# Patient Record
Sex: MALE | Race: BLACK OR AFRICAN AMERICAN | NOT HISPANIC OR LATINO | Employment: OTHER | URBAN - NONMETROPOLITAN AREA
[De-identification: names, ages, dates, MRNs, and addresses within clinical notes are randomized per-mention and may not be internally consistent; named-entity substitution may affect disease eponyms.]

---

## 2017-01-23 ENCOUNTER — OFFICE VISIT (OUTPATIENT)
Dept: NEUROSURGERY | Facility: CLINIC | Age: 42
End: 2017-01-23

## 2017-01-23 VITALS
BODY MASS INDEX: 25.61 KG/M2 | HEIGHT: 64 IN | WEIGHT: 150 LBS | DIASTOLIC BLOOD PRESSURE: 90 MMHG | SYSTOLIC BLOOD PRESSURE: 150 MMHG

## 2017-01-23 DIAGNOSIS — M51.36 DEGENERATION OF LUMBAR INTERVERTEBRAL DISC: Primary | ICD-10-CM

## 2017-01-23 DIAGNOSIS — Z78.9 NONSMOKER: ICD-10-CM

## 2017-01-23 DIAGNOSIS — M54.16 LUMBAR RADICULOPATHY: ICD-10-CM

## 2017-01-23 PROCEDURE — 99214 OFFICE O/P EST MOD 30 MIN: CPT | Performed by: NEUROLOGICAL SURGERY

## 2017-01-23 RX ORDER — CYCLOBENZAPRINE HCL 10 MG
TABLET ORAL 3 TIMES DAILY
COMMUNITY

## 2017-01-23 RX ORDER — TEMAZEPAM 30 MG/1
CAPSULE ORAL
COMMUNITY
End: 2017-10-27 | Stop reason: SDUPTHER

## 2017-01-23 RX ORDER — PRAZOSIN HYDROCHLORIDE 1 MG/1
CAPSULE ORAL
COMMUNITY
End: 2017-10-27 | Stop reason: DRUGHIGH

## 2017-01-23 RX ORDER — BUSPIRONE HYDROCHLORIDE 15 MG/1
TABLET ORAL
COMMUNITY
End: 2017-10-27 | Stop reason: SDUPTHER

## 2017-01-23 RX ORDER — MIRTAZAPINE 15 MG/1
TABLET, FILM COATED ORAL
COMMUNITY
End: 2017-10-27 | Stop reason: DRUGHIGH

## 2017-01-23 RX ORDER — NAPROXEN 25 MG/ML
SUSPENSION ORAL
COMMUNITY

## 2017-01-23 RX ORDER — SAW/VIT E/SOD SEL/LYC/BETA/PYG 160-100
TABLET ORAL
COMMUNITY

## 2017-01-23 NOTE — MR AVS SNAPSHOT
Tomasa Flores   1/23/2017 2:45 PM   Office Visit    Dept Phone:  [phone]   Encounter #:  [id number]    Provider:  Pepe Hernandez MD   Department:  [facility] NEUROSURGERY                Your Full Care Plan              Your Updated Medication List          This list is accurate as of: 1/23/17  3:38 PM.  Always use your most recent med list.                ACETAMINOPHEN-CODEINE PO       BEE POLLEN PO       * busPIRone 15 MG tablet   Commonly known as:  BUSPAR       * busPIRone 15 MG tablet   Commonly known as:  BUSPAR       * cyclobenzaprine 10 MG tablet   Commonly known as:  FLEXERIL       * cyclobenzaprine 10 MG tablet   Commonly known as:  FLEXERIL       esomeprazole 40 MG capsule   Commonly known as:  nexIUM       FISH OIL PO       gabapentin 800 MG tablet   Commonly known as:  NEURONTIN       latanoprost 0.005 % ophthalmic solution   Commonly known as:  XALATAN       lisinopril 20 MG tablet   Commonly known as:  PRINIVIL,ZESTRIL       LUTEIN-BILBERRY PO       * mirtazapine 15 MG tablet   Commonly known as:  REMERON       * mirtazapine 45 MG tablet   Commonly known as:  REMERON       naproxen 125 MG/5ML suspension   Commonly known as:  NAPROSYN       OCUVITE PO       * prazosin 1 MG capsule   Commonly known as:  MINIPRESS       * prazosin 2 MG capsule   Commonly known as:  MINIPRESS       PROBIOTIC & ACIDOPHILUS EX ST capsule       * temazepam 30 MG capsule   Commonly known as:  RESTORIL       * temazepam 30 MG capsule   Commonly known as:  RESTORIL       vitamin E 100 UNIT capsule       * Notice:  This list has 10 medication(s) that are the same as other medications prescribed for you. Read the directions carefully, and ask your doctor or other care provider to review them with you.            You Were Diagnosed With        Codes Comments    Degeneration of lumbar intervertebral disc    -  Primary ICD-10-CM: M51.36  ICD-9-CM: 722.52     Lumbar  "radiculopathy     ICD-10-CM: M54.16  ICD-9-CM: 724.4     Nonsmoker     ICD-10-CM: Z78.9  ICD-9-CM: V49.89       Instructions     None    Patient Instructions History      Upcoming Appointments     Visit Type Date Time Department    FOLLOW UP 1/23/2017  2:45 PM [facility]UROS[facility]    FOLLOW UP 2/15/2017 10:30 AM [facility]      MyChart Signup     Our records indicate that you have declined [facility] sign[facility]. If you would like to sign up for [employer], please email [email or web address] or call [phone] to obtain an activation code.             Other Info from Your Visit           Your Appointments     Feb 15, 2017 10:30 AM CST   Follow Up with JANET Vargas   [facility] (--)    [address] [place] [id number] [phone]           Arrive 15 minutes prior to appointment.              Allergies     Aspirin      Caffeine      Aloe  Rash      Reason for Visit     Back Pain     Leg Pain Right      Vital Signs     Blood Pressure Height Weight Body Mass Index Smoking Status       150/90 64\" (162.6 cm) 150 lb (68 kg) 25.75 kg/m2 Former Smoker       Problems and Diagnoses Noted     Degeneration of lumbar or lumbosacral intervertebral disc    Lumbar nerve root disorder    Nonsmoker            "

## 2017-01-23 NOTE — LETTER
January 23, 2017     Kamila Gibson MD  [address] [facility] [address]    Patient: Tomasa Flores   YOB: 1975   Date of Visit: 1/23/2017       Dear Dr. Arthur MD:    Tomasa Flores was in my office today. Below is a copy of my note.    If you have questions, please do not hesitate to call me. I look forward to following Tomasa along with you.         Sincerely,        Pepe Hernandez MD        CC: No Recipients    Patient ID: Tomaas Flores is a 41 y.o. male here today for []  low back and right leg pain    HPI:  [] Mr. Tolentino work related injury 2005.  He was lifting on a barge when he felt something snap in his back.  This was during her cane Janay for working on a barge.  He has had MRI.  I reviewed this.  He has the lower levels with bulging disc narrowed foramen towards her right.  An EMG nerve conduction was ordered to help determine the for specific nerve root may be involved.  We do not have that report as yet.  He has had pain management with Dr. Sean Damon.  He would like to have surgery if indicated and get off pain medication.    The following portions of the patient's history were reviewed and updated as appropriate: allergies, current medications, past family history, past medical history, past social history, past surgical history and problem list.    Review of Systems:  unchanged    Past Medical History   Diagnosis Date   • Hypertension        No family history on file.    Social History   Substance Use Topics   • Smoking status: Former Smoker   • Smokeless tobacco: None      Comment: Quit 2011   • Alcohol use No       Past Surgical History   Procedure Laterality Date   • Shoulder surgery Right    • Gallbladder surgery         Aspirin; Caffeine; and Aloe      Physical Exam:  [] Gait abnormal very antalgic limps on the right lower extremity.  Straightening of lower back.  Limited forward flexion.  Straight leg raising painful on right.  He has weakness quadriceps on the  right.  Diminished knee jerk on the right.  S clear heart regular no murmur no bruit.    Review of Radiographic Studies:  [] MRI lumbar spine reviewed again.      Medical Decision Making:  [] EMG nerve conduction as not reported as yet and not available to me today.  He probably will need some form of decompressive surgery.  It is a question of which or how many levels.  I would like to see the EMG before making that decision.    1. Degeneration of lumbar intervertebral disc    2. Lumbar radiculopathy    3. Nonsmoker         Return in about 3 weeks (around 2/13/2017).

## 2017-01-23 NOTE — PROGRESS NOTES
Patient ID: Tomasa Flores is a 41 y.o. male here today for []  low back and right leg pain    HPI:  [] Mr. Tolentino work related injury 2005.  He was lifting on a barge when he felt something snap in his back.  This was during her cane Janay for working on a barge.  He has had MRI.  I reviewed this.  He has the lower levels with bulging disc narrowed foramen towards her right.  An EMG nerve conduction was ordered to help determine the for specific nerve root may be involved.  We do not have that report as yet.  He has had pain management with Dr. Sean Damon.  He would like to have surgery if indicated and get off pain medication.    The following portions of the patient's history were reviewed and updated as appropriate: allergies, current medications, past family history, past medical history, past social history, past surgical history and problem list.    Review of Systems:  unchanged    Past Medical History   Diagnosis Date   • Hypertension        No family history on file.    Social History   Substance Use Topics   • Smoking status: Former Smoker   • Smokeless tobacco: None      Comment: Quit 2011   • Alcohol use No       Past Surgical History   Procedure Laterality Date   • Shoulder surgery Right    • Gallbladder surgery         Aspirin; Caffeine; and Aloe      Physical Exam:  [] Gait abnormal very antalgic limps on the right lower extremity.  Straightening of lower back.  Limited forward flexion.  Straight leg raising painful on right.  He has weakness quadriceps on the right.  Diminished knee jerk on the right.  S clear heart regular no murmur no bruit.    Review of Radiographic Studies:  [] MRI lumbar spine reviewed again.      Medical Decision Making:  [] EMG nerve conduction as not reported as yet and not available to me today.  He probably will need some form of decompressive surgery.  It is a question of which or how many levels.  I would like to see the EMG before making that decision.    1.  Degeneration of lumbar intervertebral disc    2. Lumbar radiculopathy    3. Nonsmoker         Return in about 3 weeks (around 2/13/2017).

## 2017-01-24 ENCOUNTER — DOCUMENTATION (OUTPATIENT)
Dept: NEUROSURGERY | Facility: CLINIC | Age: 42
End: 2017-01-24

## 2017-02-15 ENCOUNTER — OFFICE VISIT (OUTPATIENT)
Dept: NEUROSURGERY | Facility: CLINIC | Age: 42
End: 2017-02-15

## 2017-02-15 VITALS
WEIGHT: 160 LBS | DIASTOLIC BLOOD PRESSURE: 76 MMHG | BODY MASS INDEX: 27.31 KG/M2 | SYSTOLIC BLOOD PRESSURE: 120 MMHG | HEIGHT: 64 IN

## 2017-02-15 DIAGNOSIS — M51.36 DEGENERATION OF LUMBAR INTERVERTEBRAL DISC: Primary | ICD-10-CM

## 2017-02-15 PROCEDURE — 99213 OFFICE O/P EST LOW 20 MIN: CPT | Performed by: NURSE PRACTITIONER

## 2017-02-15 NOTE — PROGRESS NOTES
"Neurosurgery Follow Up Office Visit      Patient Name:  Tomasa Flores  Age:  41 y.o.  YOB: 1975  MR#:  [id number]    Visit Vitals   • /76   • Ht 64\" (162.6 cm)   • Wt 160 lb (72.6 kg)   • BMI 27.46 kg/m2       Social History   Substance Use Topics   • Smoking status: Former Smoker   • Smokeless tobacco: Never Used      Comment: Quit 2011   • Alcohol use No       Chief Complaint   Patient presents with   • Results     Patient here for review of EMG/NCV results. He reports no changes in symptoms.          History  Chief Complaint:  He and his wife return to the office for follow-up and additional test results.  He continues having low back and right leg pain only.  He has never had any symptoms on the left.  He continues to describe pain in his right groin and actually more mesially down the right leg.  He states that he does get numbness and tingling in the leg, but more in his right foot and toes.  He feels he is very weak and that his weakness continues to progress.  He has tried all conservative measures of treatment.  They explained that physical therapy was put on hold as it actually seemed to worsen his pain and symptoms rather than provide any help.  Dr. Denise in discontinued epidural injections as they had become ineffective.  The patient continues on a combination of chronic medications for treatment of his continued pain and symptoms.  Previous MRI review showed disc degeneration most noted at L5-S1 more rightward and some foraminal narrowing bilaterally at L4 5.  We needed EMG nerve conduction testing to help make a more definitive decision and the results of that were normal.  He has returned today to make a definite decision on surgery.      Physical Examination:  On exam, he does seem uncomfortable.  He is awake and alert, pleasant and cooperative, speech clear and appropriate.  Skin is warm and dry.  Straight leg raising positive on the right at approximately 30°.  He is " areflexic at the right patellar, even with reinforcement.  He is a good 1-2+ at both ankles.  He has significant weakness on the right and cannot even attempt some of the muscle group testing.  He is weak with dorsiflexion.  He ambulates with very antalgic gait favoring the right and needs the assistance of a cane.  Lumbar mobility is very limited.      Medical Decision Making  Treatment Options:   In the office we have discussed his care.  I have asked for Dr. Hernandez to again reviewed the lumbar MRI with the addition of the normal nerve conduction results.  After reviewing all of this in total, it is not felt that surgery would be beneficial for the patient.  Dr. Hernandez has also examined the patient and personally reviewed with him his review of the testing.  The patient's presentation is more of a probable stretching injury to the nerves on the right with resulting chronic issues.  It is not felt that any attempts at lumbar surgery would alleviate any of the symptoms that he has or help to improve his overall situation.  The patient and his family are understanding of that.  It is recommended that he continue to follow with Dr. Denise.  We would see the patient back at any time in the future if he developed any new or significantly worsening problems and if repeat imaging were indicated.  This is all agreeable.      Assessment and Plan  Tomasa was seen today for results.    Diagnoses and all orders for this visit:    Degeneration of lumbar intervertebral disc    Other orders  -     SCANNED EMG        Return if symptoms worsen or fail to improve.      JANET Kang

## 2017-03-21 ENCOUNTER — HOSPITAL ENCOUNTER (OUTPATIENT)
Age: 42
Setting detail: OUTPATIENT SURGERY
Discharge: HOME | End: 2017-03-21
Attending: PHYSICAL MEDICINE & REHABILITATION | Admitting: PHYSICAL MEDICINE & REHABILITATION

## 2017-03-21 ENCOUNTER — HOSPITAL ENCOUNTER (OUTPATIENT)
Dept: GENERAL RADIOLOGY | Age: 42
Discharge: HOME | End: 2017-03-21
Payer: MEDICAID

## 2017-03-21 VITALS
SYSTOLIC BLOOD PRESSURE: 117 MMHG | RESPIRATION RATE: 20 BRPM | WEIGHT: 160 LBS | DIASTOLIC BLOOD PRESSURE: 77 MMHG | BODY MASS INDEX: 27.31 KG/M2 | HEIGHT: 64 IN | HEART RATE: 80 BPM | OXYGEN SATURATION: 98 %

## 2017-03-21 DIAGNOSIS — R52 PAIN: ICD-10-CM

## 2017-03-21 PROCEDURE — 3209999900 FLUORO FOR SURGICAL PROCEDURES

## 2017-03-21 PROCEDURE — G8907 PT DOC NO EVENTS ON DISCHARG: HCPCS

## 2017-03-21 PROCEDURE — G8918 PT W/O PREOP ORDER IV AB PRO: HCPCS

## 2017-03-21 PROCEDURE — 62322 NJX INTERLAMINAR LMBR/SAC: CPT

## 2017-03-21 RX ORDER — LIDOCAINE HYDROCHLORIDE 10 MG/ML
INJECTION, SOLUTION INFILTRATION; PERINEURAL PRN
Status: DISCONTINUED | OUTPATIENT
Start: 2017-03-21 | End: 2017-03-21 | Stop reason: HOSPADM

## 2017-03-21 RX ORDER — 0.9 % SODIUM CHLORIDE 0.9 %
VIAL (ML) INJECTION PRN
Status: DISCONTINUED | OUTPATIENT
Start: 2017-03-21 | End: 2017-03-21 | Stop reason: HOSPADM

## 2017-03-21 RX ORDER — METHYLPREDNISOLONE ACETATE 80 MG/ML
INJECTION, SUSPENSION INTRA-ARTICULAR; INTRALESIONAL; INTRAMUSCULAR; SOFT TISSUE PRN
Status: DISCONTINUED | OUTPATIENT
Start: 2017-03-21 | End: 2017-03-21 | Stop reason: HOSPADM

## 2017-05-01 ENCOUNTER — HOSPITAL ENCOUNTER (OUTPATIENT)
Dept: GENERAL RADIOLOGY | Facility: HOSPITAL | Age: 42
Discharge: HOME | End: 2017-05-01
Admitting: NURSE PRACTITIONER

## 2017-05-01 ENCOUNTER — TRANSCRIBE ORDERS (OUTPATIENT)
Dept: ADMINISTRATIVE | Facility: HOSPITAL | Age: 42
End: 2017-05-01

## 2017-05-01 DIAGNOSIS — M25.551 TENDERNESS OF RIGHT HIP: ICD-10-CM

## 2017-05-01 DIAGNOSIS — M25.551 TENDERNESS OF RIGHT HIP: Primary | ICD-10-CM

## 2017-05-01 PROCEDURE — 73502 X-RAY EXAM HIP UNI 2-3 VIEWS: CPT

## 2017-05-30 ENCOUNTER — HOSPITAL ENCOUNTER (OUTPATIENT)
Age: 42
Setting detail: OUTPATIENT SURGERY
Discharge: HOME | End: 2017-05-30
Attending: PHYSICAL MEDICINE & REHABILITATION | Admitting: PHYSICAL MEDICINE & REHABILITATION

## 2017-05-30 ENCOUNTER — HOSPITAL ENCOUNTER (OUTPATIENT)
Dept: GENERAL RADIOLOGY | Age: 42
Discharge: HOME | End: 2017-05-30
Payer: MEDICAID

## 2017-05-30 VITALS
SYSTOLIC BLOOD PRESSURE: 138 MMHG | RESPIRATION RATE: 18 BRPM | DIASTOLIC BLOOD PRESSURE: 88 MMHG | WEIGHT: 160 LBS | OXYGEN SATURATION: 97 % | HEIGHT: 64 IN | BODY MASS INDEX: 27.31 KG/M2 | HEART RATE: 84 BPM

## 2017-05-30 DIAGNOSIS — R52 PAIN: ICD-10-CM

## 2017-05-30 PROCEDURE — G8907 PT DOC NO EVENTS ON DISCHARG: HCPCS

## 2017-05-30 PROCEDURE — 62322 NJX INTERLAMINAR LMBR/SAC: CPT

## 2017-05-30 PROCEDURE — 3209999900 FLUORO FOR SURGICAL PROCEDURES

## 2017-05-30 PROCEDURE — G8918 PT W/O PREOP ORDER IV AB PRO: HCPCS

## 2017-05-30 PROCEDURE — 62323 NJX INTERLAMINAR LMBR/SAC: CPT

## 2017-05-30 RX ORDER — METHYLPREDNISOLONE ACETATE 80 MG/ML
INJECTION, SUSPENSION INTRA-ARTICULAR; INTRALESIONAL; INTRAMUSCULAR; SOFT TISSUE PRN
Status: DISCONTINUED | OUTPATIENT
Start: 2017-05-30 | End: 2017-05-30 | Stop reason: HOSPADM

## 2017-05-30 RX ORDER — LIDOCAINE HYDROCHLORIDE 10 MG/ML
INJECTION, SOLUTION INFILTRATION; PERINEURAL PRN
Status: DISCONTINUED | OUTPATIENT
Start: 2017-05-30 | End: 2017-05-30 | Stop reason: HOSPADM

## 2017-05-30 RX ORDER — 0.9 % SODIUM CHLORIDE 0.9 %
VIAL (ML) INJECTION PRN
Status: DISCONTINUED | OUTPATIENT
Start: 2017-05-30 | End: 2017-05-30 | Stop reason: HOSPADM

## 2017-05-30 RX ORDER — LISINOPRIL 10 MG/1
10 TABLET ORAL DAILY
COMMUNITY

## 2017-10-27 ENCOUNTER — APPOINTMENT (OUTPATIENT)
Dept: LAB | Facility: HOSPITAL | Age: 42
End: 2017-10-27

## 2017-10-27 ENCOUNTER — OFFICE VISIT (OUTPATIENT)
Dept: PAIN MEDICINE | Facility: CLINIC | Age: 42
End: 2017-10-27

## 2017-10-27 VITALS
BODY MASS INDEX: 26.24 KG/M2 | WEIGHT: 153.7 LBS | SYSTOLIC BLOOD PRESSURE: 138 MMHG | HEIGHT: 64 IN | DIASTOLIC BLOOD PRESSURE: 88 MMHG

## 2017-10-27 DIAGNOSIS — Z79.899 HIGH RISK MEDICATIONS (NOT ANTICOAGULANTS) LONG-TERM USE: ICD-10-CM

## 2017-10-27 DIAGNOSIS — M79.18 MYOFACIAL MUSCLE PAIN: ICD-10-CM

## 2017-10-27 DIAGNOSIS — M54.5 CHRONIC LOW BACK PAIN, UNSPECIFIED BACK PAIN LATERALITY, WITH SCIATICA PRESENCE UNSPECIFIED: Primary | ICD-10-CM

## 2017-10-27 DIAGNOSIS — M51.36 DDD (DEGENERATIVE DISC DISEASE), LUMBAR: ICD-10-CM

## 2017-10-27 DIAGNOSIS — M25.551 CHRONIC RIGHT HIP PAIN: ICD-10-CM

## 2017-10-27 DIAGNOSIS — G89.29 CHRONIC LOW BACK PAIN, UNSPECIFIED BACK PAIN LATERALITY, WITH SCIATICA PRESENCE UNSPECIFIED: Primary | ICD-10-CM

## 2017-10-27 DIAGNOSIS — G89.29 CHRONIC RIGHT HIP PAIN: ICD-10-CM

## 2017-10-27 DIAGNOSIS — M47.817 LUMBOSACRAL SPONDYLOSIS WITHOUT MYELOPATHY: ICD-10-CM

## 2017-10-27 PROCEDURE — 99214 OFFICE O/P EST MOD 30 MIN: CPT | Performed by: NURSE PRACTITIONER

## 2017-10-27 PROCEDURE — 80307 DRUG TEST PRSMV CHEM ANLYZR: CPT | Performed by: NURSE PRACTITIONER

## 2017-10-27 PROCEDURE — G0481 DRUG TEST DEF 8-14 CLASSES: HCPCS | Performed by: NURSE PRACTITIONER

## 2017-10-27 RX ORDER — ERGOCALCIFEROL (VITAMIN D2) 10 MCG
800 TABLET ORAL DAILY
COMMUNITY

## 2017-10-27 NOTE — PROGRESS NOTES
"Tomasa Flores is a 42 y.o. male.   1975    HPI:   Location: lower back, right hip and right leg  Quality: aching, sharp, dull, numbness and tingling  Severity: 8/10  Timing: constant  Alleviating: relaxation, pain medication and heating pad  Aggravating: sitting , standing , ambulating , increased activity and weather      Patient referred to pain management via Dr. Gibson related to chronic lower back pain with reported right leg involvement. Pt with chronic lower back pain and right leg pain for 12 years since work related injury. No bowel or bladder.  Pt was seen by Camelia, pt had MRI done. Pt was seen by Dr. Woody Neuro Surgeon- possible surgery at some point. Pt states \"he acted all interested then all of a sudden stopped\". Pt wanted 2nd opinion- neuro surgeon in [facility] [place] ( Dr. Shankar) . Myelogram done and \"tore ligaments in right hip\". Pt had right hip injection (Dr. Lara) with some effectiveness. I have read EMG from Dr. Goins- \"Neg EMG\".  Pt will follow up with Dr. Lara. Pt currently sees Dr. Alexander [facility] for PTSD, Depression, and Anxiety- I have encouraged patient to continue follow ups related to PHQ-9 27. Explained in great detail history and physical, examination, ancillary physician notes and images reviewed, and pain management options.          The following portions of the patient's history were reviewed by me and updated as appropriate: allergies, current medications, past family history, past medical history, past social history, past surgical history and problem list.    Past Medical History:   Diagnosis Date   • Anxiety    • Arthritis    • Depression    • Glaucoma    • Headache    • Hypertension    • Insomnia        Social History     Social History   • Marital status: Single     Spouse name: N/A   • Number of children: N/A   • Years of education: N/A     Occupational History   • Disabled      Social History Main Topics   • Smoking status: Former Smoker   • " Smokeless tobacco: Never Used      Comment: Quit 2011 still smokes occassionally   • Alcohol use No   • Drug use: No   • Sexual activity: Defer     Other Topics Concern   • Not on file     Social History Narrative       Family History   Problem Relation Age of Onset   • Alcohol abuse Mother    • Heart disease Mother    • Hypertension Mother    • Mental illness Mother    • Depression Mother    • Diabetes Father    • Heart disease Father    • Hypertension Father    • Depression Sister    • Mental illness Sister    • Depression Sister    • Mental illness Sister    • Depression Sister    • Mental illness Sister    • Mental illness Brother    • Depression Brother    • Glaucoma Brother    • Crohn's disease Brother    • Hypertension Brother    • Alcohol abuse Brother    • Early death Brother      murder   • Cancer Maternal Aunt      lung         Current Outpatient Prescriptions:   •  ACETAMINOPHEN-CODEINE PO, Take 75 mg by mouth., Disp: , Rfl:   •  BEE POLLEN PO, Take by mouth, Disp: , Rfl:   •  busPIRone (BUSPAR) 15 MG tablet, , Disp: , Rfl:   •  cyclobenzaprine (FLEXERIL) 10 MG tablet, 3 (Three) Times a Day., Disp: , Rfl:   •  gabapentin (NEURONTIN) 800 MG tablet, , Disp: , Rfl:   •  latanoprost (XALATAN) 0.005 % ophthalmic solution, , Disp: , Rfl:   •  lisinopril (PRINIVIL,ZESTRIL) 20 MG tablet, , Disp: , Rfl:   •  LUTEIN-BILBERRY PO, Take by mouth, Disp: , Rfl:   •  mirtazapine (REMERON) 45 MG tablet, , Disp: , Rfl:   •  naproxen (NAPROSYN) 125 MG/5ML suspension, Take by mouth 2 times daily, Disp: , Rfl:   •  prazosin (MINIPRESS) 2 MG capsule, , Disp: , Rfl:   •  Probiotic Product (PROBIOTIC & ACIDOPHILUS EX ST) capsule, Take by mouth, Disp: , Rfl:   •  temazepam (RESTORIL) 30 MG capsule, , Disp: , Rfl:   •  Vitamin D, Cholecalciferol, (CHOLECALCIFEROL) 400 units tablet, Take 800 Units by mouth Daily., Disp: , Rfl:   •  vitamin E 100 UNIT capsule, Take 100 Units by mouth daily, Disp: , Rfl:   •  esomeprazole (nexIUM)  40 MG capsule, , Disp: , Rfl:   •  Multiple Vitamins-Minerals (OCUVITE PO), Take by mouth, Disp: , Rfl:   •  Omega-3 Fatty Acids (FISH OIL PO), Take by mouth, Disp: , Rfl:     Allergies   Allergen Reactions   • Aspirin Hives     High dose   • Caffeine    • Aloe Rash       Review of Systems   Musculoskeletal: Positive for back pain (lower).        Right hip pain  Right leg pain   All other systems reviewed and are negative.    All review of systems reviewed and negative except for above.    Physical Exam   Constitutional: He is oriented to person, place, and time. He appears well-developed and well-nourished. No distress.   HENT:   Head: Normocephalic and atraumatic.   Eyes: Conjunctivae and EOM are normal. Pupils are equal, round, and reactive to light.   Neck: Normal range of motion.   Cardiovascular: Normal rate and regular rhythm.    Pulmonary/Chest: Effort normal and breath sounds normal. No respiratory distress.   Abdominal: Soft. Bowel sounds are normal.   Musculoskeletal:        Right hip: He exhibits decreased range of motion and decreased strength.        Lumbar back: He exhibits decreased range of motion ( Flex 90 deg and 10 deg ext with dejan facet loading) and tenderness ( midline ttp).   Neurological: He is alert and oriented to person, place, and time. He displays no tremor. He displays no seizure activity. Gait ( WC mobility) abnormal. Coordination normal.   Reflex Scores:       Tricep reflexes are 1+ on the right side and 1+ on the left side.       Bicep reflexes are 2+ on the right side and 2+ on the left side.       Brachioradialis reflexes are 2+ on the right side and 2+ on the left side.       Patellar reflexes are 0 on the right side and 2+ on the left side.       Achilles reflexes are 0 on the right side and 1+ on the left side.  Mild slr right    Upper arm str 5/5- with strong encouragment    Lower leg str 4/5 right leg- with strong encouragment   Skin: Skin is warm and dry. He is not  diaphoretic.   Psychiatric: He has a normal mood and affect. His behavior is normal. He expresses no homicidal and no suicidal ideation. He expresses no suicidal plans and no homicidal plans.   Talkative   Nursing note and vitals reviewed.      Tomasa was seen today for back pain, hip pain and leg pain.    Diagnoses and all orders for this visit:    Chronic low back pain, unspecified back pain laterality, with sciatica presence unspecified    Lumbosacral spondylosis without myelopathy    DDD (degenerative disc disease), lumbar    Chronic right hip pain    Myofacial muscle pain    High risk medications (not anticoagulants) long-term use  -     ToxASSURE Select 13 (MW) - Urine, Clean Catch        Medication: None at this time Pt is currently on neurontin, flexeril, and naprosyn. Pt may discuss with heather about medications and letting Dr. Wayne provide injections.         Interventional: I will order UDS today for eval next visit.  Pt will need to sign for Dr Denise and Dr. Shankar/Jane notes- and call back 3 days before next appt to ensure note arrival. Keep appt with Dr. Alexander and Dr Shankar for Mental Health. Pt is not an opiate candidate today, unsure if opiates will be a portion of PM.       I have reviewed ancillary notes and images for today's examination;    FINDINGS-  There is normal alignment of the lumbar spine. Vertebral body heights  are maintained throughout the lumbar spine. Minimal abnormal marrow  signal is seen at the endplates compatible with degenerative change.  Marrow signal is otherwise normal. There is loss of normal disc signal  at L4-L5 and L5-S1, with associated loss of disc space height. The conus  medullaris terminates normally at the level of the L1 vertebral body.  The visualized paraspinal soft tissues are normal in appearance.      L1-L2- Mild ligamentous and facet hypertrophy without significant spinal  canal narrowing or neural foraminal narrowing.      L2-L3- Diffuse disc bulge  "with ligamentous and facet hypertrophy result  in mild effacement of the CSF space and moderate neural foraminal  narrowing without appreciable mass effect upon the exiting nerve roots.      L3-L4- Diffuse disc protrusion with ligamentous and facet hypertrophy  result in effacement of the CSF space without significant canal  stenosis. There is marked right and moderate left neural foraminal  narrowing without appreciable mass effect upon the exiting nerve roots.      L4-L5- Diffuse disc bulge, eccentric to the right, results in effacement  of the CSF space without significant canal stenosis. There is marked  bilateral neural foraminal narrowing with mass effect upon the right  exiting nerve root.      L5-S1- Diffuse disc bulge with ligamentous and facet hypertrophy result  in effacement of the CSF space with narrowing of the spinal canal to 7  mm in the AP dimension. There is associated severe bilateral neural  foraminal narrowing with mass effect upon the exiting nerve roots  bilaterally.      IMPRESSION-  Multilevel degenerative changes, most pronounced at L5-S1 as above.   Released Date Time- 09/12/16 1106             Rehab: none at this time, I have discussed the CDC recommendations of Benzo (temazepam) and Opiate ( Norco), Pt states \"will talk to Dr. Alexander\"      Behavioral: No aberrant behavior noted. ROSANGELA Report # [id number]  was reviewed and is consistent with stated history    Urine drug screen Ordered today to test for drugs of abuse and prescribed medications     ORT: 4  Pt currently sees Dr Alexander for Depression, PTSD, and Anxiety. No si thoughts or si ideas. Offered counseling information regarding to any situation that patient may need assistance.  Informed patient to seek emergency counseling or treatment if any uncontrollable depression changes or suicidal thoughts are noted.      PHQ-9: 27- I have encouraged patient to seek or continue counseling.           This document has been electronically signed " by JANET Lopez on October 30, 2017 1:14 PM          This document has been electronically signed by JANET Lopez on October 30, 2017 1:14 PM

## 2017-11-03 LAB — CONV REPORT SUMMARY: NORMAL

## 2019-11-14 PROBLEM — D72.9 NEUTROPHILIC LEUKOCYTOSIS: Status: ACTIVE | Noted: 2019-11-14

## 2019-11-14 NOTE — PROGRESS NOTES
[facility] ONC [facility] MEDICAL GROUP ONCOLOGY  [address] [address]  [phone]    Patient Name: Tomasa Flores  Encounter Date: 11/21/2019  YOB: 1975  Patient Number: [id number]      REASON FOR FOLLOW-UP: Tomasa Flores is a pleasant 44-year-old -American male who is seen on followup for reactive leukocytosis.  He is seen alone.  History is obtained from the patient.  History is considered to be accurate.      Problem List Items Addressed This Visit        Immune and Lymphatic    Neutrophilic leukocytosis - Primary    Overview     DIAGNOSTIC ABNORMALITIES:   The patient was seen by Dr. Kamila Gibson 07/01/2015. The patient was complaining of lower back pain.  Previous CBC 06/19/2014 revealed a WBC of 8.7, hemoglobin 13, hematocrit 38.7, MCV 94, platelet count 319,000.  CBC 01/05/2015 revealed a WBC of 13, ANC 8.8, hemoglobin 14.3, hematocrit 42.1, MCV 96, platelet count 307,000. CMP was unremarkable.  CBC 03/11/2015 revealed a WBC of 10.5, hemoglobin 13.1, hematocrit 39.1, MCV 96, platelet count 306,000. ANC was 5.9.  CBC 07/01/2015 revealed a WBC of 15.5, hemoglobin 12.8, hematocrit 37.9, MCV 96, platelet count 306,000. Neutrophil was 11.2. CMP was unremarkable.  Medication review is negative for steroids. The patient had persistent leukocytosis.  PATHOLOGY: Comprehensive Report done on 08/19/2015 by [facility]. Final Diagnosis: Peripheral blood: High-normal white blood cell count with a normal differential. Normochromic, normocytic red blood cells with probable rouleaux formation.  Flow Cytometry done on 08/16/2015 by [facility]. Final Diagnosis: Peripheral blood: Flow Impression:  No increase in myeloid or lymphoid blasts identified.  Immunophenotypic abnormalities of circulating granulocytes and monocytes identified. No immunophenotypically abnormal B- or T-cell population identified.  FISH report done on 08/19/2015 by [facility]. Final Diagnosis: Peripheral  blood: FISH Impression:  Negative for the tested MDS-associated genetic abnormalities.      PREVIOUS INTERVENTIONS:   Observation.               No history exists.       PAST MEDICAL HISTORY:  ALLERGIES:  Allergies   Allergen Reactions   • Aspirin Hives     High dose   • Caffeine    • Aloe Rash     CURRENT MEDICATIONS:  Outpatient Encounter Medications as of 11/21/2019   Medication Sig Dispense Refill   • ACETAMINOPHEN-CODEINE PO Take 75 mg by mouth.     • busPIRone (BUSPAR) 15 MG tablet      • cyclobenzaprine (FLEXERIL) 10 MG tablet 3 (Three) Times a Day.     • gabapentin (NEURONTIN) 800 MG tablet      • latanoprost (XALATAN) 0.005 % ophthalmic solution      • lisinopril (PRINIVIL,ZESTRIL) 20 MG tablet      • mirtazapine (REMERON) 45 MG tablet      • naproxen (NAPROSYN) 125 MG/5ML suspension Take by mouth 2 times daily     • prazosin (MINIPRESS) 2 MG capsule      • QUEtiapine (SEROquel) 100 MG tablet Take 100 mg by mouth Every Night.     • BEE POLLEN PO Take by mouth     • esomeprazole (nexIUM) 40 MG capsule      • LUTEIN-BILBERRY PO Take by mouth     • Multiple Vitamins-Minerals (OCUVITE PO) Take by mouth     • Omega-3 Fatty Acids (FISH OIL PO) Take by mouth     • Probiotic Product (PROBIOTIC & ACIDOPHILUS EX ST) capsule Take by mouth     • temazepam (RESTORIL) 30 MG capsule      • Vitamin D, Cholecalciferol, (CHOLECALCIFEROL) 400 units tablet Take 800 Units by mouth Daily.     • vitamin E 100 UNIT capsule Take 100 Units by mouth daily       No facility-administered encounter medications on file as of 11/21/2019.      ADULT ILLNESSES:  Patient Active Problem List   Diagnosis Code   • Degeneration of lumbar intervertebral disc M51.36   • Lumbar radiculopathy M54.16   • Neutrophilic leukocytosis D72.9     SURGERIES:  Past Surgical History:   Procedure Laterality Date   • FOREIGN BODY REMOVAL Left 2015    left forearm   • GALLBLADDER SURGERY      laparoscopic   • SHOULDER SURGERY Right     x 2     HEALTH MAINTENANCE  "ITEMS:  Health Maintenance Due   Topic Date Due   • ANNUAL PHYSICAL  06/17/1977   • TDAP/TD VACCINES (1 - Tdap) 06/17/1993   • INFLUENZA VACCINE  08/01/2019       <no information>  Last Completed Colonoscopy     Patient has no health maintenance due at this time          There is no immunization history on file for this patient.  Last Completed Mammogram     Patient has no health maintenance due at this time            FAMILY HISTORY:  Family History   Problem Relation Age of Onset   • Alcohol abuse Mother    • Heart disease Mother    • Hypertension Mother    • Mental illness Mother    • Depression Mother    • Diabetes Father    • Heart disease Father    • Hypertension Father    • Depression Sister    • Mental illness Sister    • Depression Sister    • Mental illness Sister    • Depression Sister    • Mental illness Sister    • Mental illness Brother    • Depression Brother    • Glaucoma Brother    • Crohn's disease Brother    • Hypertension Brother    • Alcohol abuse Brother    • Early death Brother         murder   • Cancer Maternal Aunt         lung     SOCIAL HISTORY:  Social History     Socioeconomic History   • Marital status: Single     Spouse name: Not on file   • Number of children: Not on file   • Years of education: Not on file   • Highest education level: Not on file   Occupational History   • Occupation: Disabled   Tobacco Use   • Smoking status: Former Smoker   • Smokeless tobacco: Never Used   • Tobacco comment: Quit 2011 still smokes occassionally   Substance and Sexual Activity   • Alcohol use: No   • Drug use: No   • Sexual activity: Defer       REVIEW OF SYSTEMS:    Review of Systems   Constitutional: Negative for activity change, chills, diaphoresis, fatigue, fever and unexpected weight loss.        \"I feel okay.\"   HENT: Negative for nosebleeds, sinus pressure, sore throat and trouble swallowing.    Eyes: Negative for blurred vision, double vision, pain and visual disturbance.   Respiratory: " "Negative for cough, shortness of breath and wheezing.    Cardiovascular: Negative for chest pain and palpitations.   Gastrointestinal: Negative for abdominal distention, abdominal pain, blood in stool, constipation, diarrhea, nausea and vomiting.   Endocrine: Negative for cold intolerance and heat intolerance.   Genitourinary: Negative for difficulty urinating, dysuria, frequency, hematuria, urgency and urinary incontinence.   Musculoskeletal:        Right hip pain.  \"I have a tear in my right hip.  I get steroid injections.\"   Skin: Positive for pallor.   Allergic/Immunologic: Negative for food allergies.   Neurological: Negative for dizziness, tremors, seizures, syncope, speech difficulty, weakness, headache and confusion.   Hematological: Negative for adenopathy. Does not bruise/bleed easily.   Psychiatric/Behavioral: Negative for agitation, dysphoric mood, hallucinations and depressed mood.         VITAL SIGNS: /76   Pulse 85   Temp 99 °F (37.2 °C)   Resp 18   Ht 162.6 cm (64\")   Wt 82.4 kg (181 lb 9.6 oz)   SpO2 95%   BMI 31.17 kg/m²  Body surface area is 1.88 meters squared.   Pain Score    11/21/19 1012   PainSc: 0-No pain       PHYSICAL EXAMINATION:     Physical Exam   Constitutional: He is oriented to person, place, and time. He appears well-nourished. No distress.   Frail looking.  He arrived in the exam room with a  cane.   HENT:   Head: Normocephalic and atraumatic.   Eyes: EOM are normal. Pupils are equal, round, and reactive to light. No scleral icterus.   Neck: Trachea normal. Neck supple.   Cardiovascular: Normal rate, regular rhythm and normal pulses. Exam reveals no gallop.   Pulmonary/Chest: Effort normal and breath sounds normal. He has no wheezes. He has no rhonchi. He has no rales. Chest wall is not dull to percussion.   Abdominal: Soft. Normal appearance and bowel sounds are normal. He exhibits no distension. There is no tenderness. There is no rebound and no guarding. "   Musculoskeletal: He exhibits no edema.   Lymphadenopathy:     He has no cervical adenopathy.     He has no axillary adenopathy.        Right: No inguinal and no supraclavicular adenopathy present.        Left: No inguinal and no supraclavicular adenopathy present.   Neurological: He is alert and oriented to person, place, and time. He has normal strength. No sensory deficit.   Skin: Skin is warm and dry. He is not diaphoretic. There is pallor.   Psychiatric: He has a normal mood and affect. His behavior is normal. Judgment and thought content normal.   Vitals reviewed.      LABS    No results for input(s): HGB, HCT, MCV, WBC, RDW, MPV, PLT, AUTOIGPER, NEUTROABS, LYMPHSABS, MONOSABS, EOSABS, BASOSABS, AUTOIGNUM, NRBC, NEUTRELPCTM, MONOPCT, BASOPCT, ATYLMPCT, ANISOCYTOSIS, GIANTPLTS in the last 75211 hours.    Invalid input(s): LYMPHOCPCT, ABCMORPH    No results for input(s): GLUCOSE, NA, K, CO2, CL, ANIONGAP, CREATININE, BUN, BCR, CALCIUM, EGFRIFNONA, ALKPHOS, PROTEINTOT, ALT, AST, BILITOT, ALBUMIN, GLOB in the last 49476 hours.    Invalid input(s): LABIL    No results for input(s): MSPIKE, KAPPALAMB, IGLFLC, URICACID, FREEKAPPAL, CEA, LDH, REFLABREPO in the last 47746 hours.    No results for input(s): IRON, TIBC, LABIRON, FERRITIN, P7ULTGT, TSH, FOLATE in the last 32577 hours.    Invalid input(s): VITB12      [unfilled]    Patient's Body mass index is 31.17 kg/m². BMI is above normal parameters. Recommendations include: referral to primary care.    Summary:  Ultrasound of the abdomen 05/22/2019 at [facility] showed lesion in the right lobe of the liver measures 5.1 cm in size.       ASSESSMENT:  1.    Normocytic anemia, likely from chronic disease. Observe.   2.    Chronic low back pain.  3.    Essential hypertension.  4.    Depression.  5.    Herniated disk, lumbar spine.  6.    Leukocytosis, reactive, from steroids.    7.    Lesion in the right lobe of the liver measures 5.1 cm in size.   He had  history of cavernous hemangioma.   8.    Obesity BMI 31.1.       PLAN:  1.     Re:  No MRI of the liver was done.    2.     Re:  Heme status.  Hemoglobin 13.4 gm, ANC 8.78, monocytes 0.99 from 0.98.   3.     Re:  Pre-office ferritin, TIBC, % saturation, and iron.  20% saturation and ferritin 140 ng/ml.   4.     Re:  Pre-office CMP.   ml/minute.  5.     Re:  Sonogram abdomen 05/22/2019.  Lesion in the right lobe of the liver measures 5.1 cm in size.   6.    Schedule MRI abdomen for lesion right lobe of the liver.  7.    Continue current medications.    8.    Plan of care discussed with patient.  Understanding expressed.  Patient agreeable to proceed.  9.    Continue ongoing management per primary care physician and other specialists.  10.  Rturn to office in 6 months with pre-office CMP, ferritin, TIBC, % saturation, iron, and CBC with differential if MRI unremarkable.      TIME SPENT:  Face-to-face time on this encounter, as defined by the American Medical Association in the 2019 Current Procedural Terminology codebook; assessment, record review, lab review, planning and education is 27 minutes.        cc:  (Ronel Anna, )         Kamila Gibson MD (Referring)         (Buddy Ma MD)         (Anders Pedro MD)         (Lavon Suggs MD)         (Darwin Alexander MD)

## 2019-11-21 ENCOUNTER — OFFICE VISIT (OUTPATIENT)
Dept: ONCOLOGY | Facility: CLINIC | Age: 44
End: 2019-11-21

## 2019-11-21 VITALS
OXYGEN SATURATION: 95 % | SYSTOLIC BLOOD PRESSURE: 132 MMHG | BODY MASS INDEX: 31 KG/M2 | WEIGHT: 181.6 LBS | DIASTOLIC BLOOD PRESSURE: 76 MMHG | HEART RATE: 85 BPM | RESPIRATION RATE: 18 BRPM | HEIGHT: 64 IN | TEMPERATURE: 99 F

## 2019-11-21 DIAGNOSIS — D72.9 NEUTROPHILIC LEUKOCYTOSIS: Primary | ICD-10-CM

## 2019-11-21 DIAGNOSIS — D64.9 ANEMIA, UNSPECIFIED TYPE: ICD-10-CM

## 2019-11-21 PROCEDURE — 99214 OFFICE O/P EST MOD 30 MIN: CPT | Performed by: INTERNAL MEDICINE

## 2019-11-21 RX ORDER — QUETIAPINE FUMARATE 100 MG/1
100 TABLET, FILM COATED ORAL NIGHTLY
COMMUNITY

## 2019-12-16 ENCOUNTER — APPOINTMENT (OUTPATIENT)
Dept: MRI IMAGING | Facility: HOSPITAL | Age: 44
End: 2019-12-16

## 2019-12-19 ENCOUNTER — APPOINTMENT (OUTPATIENT)
Dept: MRI IMAGING | Facility: HOSPITAL | Age: 44
End: 2019-12-19

## 2020-01-14 ENCOUNTER — TELEPHONE (OUTPATIENT)
Dept: ONCOLOGY | Facility: OTHER | Age: 45
End: 2020-01-14

## 2020-01-14 ENCOUNTER — TELEPHONE (OUTPATIENT)
Dept: ONCOLOGY | Facility: CLINIC | Age: 45
End: 2020-01-14

## 2020-01-14 DIAGNOSIS — K76.9 LIVER DAMAGE: Primary | ICD-10-CM

## 2020-01-14 DIAGNOSIS — K76.9 LIVER DISORDER: Primary | ICD-10-CM

## 2020-01-14 NOTE — TELEPHONE ENCOUNTER
Received call from patient, he has new Insurance he would like to have MRI of Abdomen cintia please is this ok    F/U May 2020   hand grasp, leg strength strong and equal bilaterally

## 2020-01-14 NOTE — TELEPHONE ENCOUNTER
On 11/21/19 pt had referral for mri abd ins denied pt now has [employer] pt wants to see if new ins will cover  needs new order placed

## 2020-01-23 ENCOUNTER — TELEPHONE (OUTPATIENT)
Dept: ONCOLOGY | Facility: CLINIC | Age: 45
End: 2020-01-23

## 2020-01-23 NOTE — TELEPHONE ENCOUNTER
Patient wanted to let Joana know that his insurance has changed to [employer] and I updated all the information and verified the insurance.     Patients Phone : [phone]

## 2020-05-20 ENCOUNTER — RESULTS ENCOUNTER (OUTPATIENT)
Dept: ONCOLOGY | Facility: CLINIC | Age: 45
End: 2020-05-20

## 2020-05-20 DIAGNOSIS — D72.9 NEUTROPHILIC LEUKOCYTOSIS: ICD-10-CM

## 2020-05-20 DIAGNOSIS — D64.9 ANEMIA, UNSPECIFIED TYPE: ICD-10-CM

## 2020-10-20 DIAGNOSIS — D50.9 IRON DEFICIENCY ANEMIA, UNSPECIFIED IRON DEFICIENCY ANEMIA TYPE: ICD-10-CM

## 2020-10-20 DIAGNOSIS — D72.9 NEUTROPHILIC LEUKOCYTOSIS: Primary | ICD-10-CM

## 2020-11-04 ENCOUNTER — RESULTS ENCOUNTER (OUTPATIENT)
Dept: ONCOLOGY | Facility: CLINIC | Age: 45
End: 2020-11-04

## 2020-11-04 DIAGNOSIS — D50.9 IRON DEFICIENCY ANEMIA, UNSPECIFIED IRON DEFICIENCY ANEMIA TYPE: ICD-10-CM

## 2020-11-11 ENCOUNTER — RESULTS ENCOUNTER (OUTPATIENT)
Dept: ONCOLOGY | Facility: CLINIC | Age: 45
End: 2020-11-11

## 2020-11-11 DIAGNOSIS — D50.9 IRON DEFICIENCY ANEMIA, UNSPECIFIED IRON DEFICIENCY ANEMIA TYPE: ICD-10-CM

## 2020-11-11 DIAGNOSIS — D72.9 NEUTROPHILIC LEUKOCYTOSIS: ICD-10-CM

## 2020-11-23 ENCOUNTER — TELEPHONE (OUTPATIENT)
Dept: ONCOLOGY | Facility: CLINIC | Age: 45
End: 2020-11-23

## 2020-11-23 NOTE — TELEPHONE ENCOUNTER
Caller: NADIA VARELA    Relationship to patient: WIFE    Best call back number: [phone]    PT MISSED THEIR APPT WITH DR BOO AND WOULD LIKE TO RESCHED. STATES THEY WANT TO WAIT UNTIL DR BOO IS BACK AT THE [place] LOCATION, DID NOT SEE ANY OPENINGS FOR DR BOO AT [place]

## 2020-12-09 NOTE — PROGRESS NOTES
[facility] ONC [facility] GROUP HEMATOLOGY AND ONCOLOGY  [address]  [place] [address] [phone]    Patient Name: Tomasa Flores  Encounter Date: 11/19/2020  YOB: 1975  Patient Number: [id number]      Telemedicine.  Patient given verbal consent for telemedicine.    REASON FOR FOLLOW-UP:  Tomasa Flores is a pleasant 45-year-old -American male on followup for reactive leukocytosis.  He is seen alone.  History is obtained from the patient.  History is considered to be accurate.    I have reviewed the HPI and verified with the patient the accuracy of it. No changes to interval history since the information was documented. Rashaun Cristobal MD 12/16/20       Problem List Items Addressed This Visit        Immune and Lymphatic    Neutrophilic leukocytosis - Primary    Overview     DIAGNOSTIC ABNORMALITIES:   The patient was seen by Dr. Kamila Gibson 07/01/2015. The patient was complaining of lower back pain.  Previous CBC 06/19/2014 revealed a WBC of 8.7, hemoglobin 13, hematocrit 38.7, MCV 94, platelet count 319,000.  CBC 01/05/2015 revealed a WBC of 13, ANC 8.8, hemoglobin 14.3, hematocrit 42.1, MCV 96, platelet count 307,000. CMP was unremarkable.  CBC 03/11/2015 revealed a WBC of 10.5, hemoglobin 13.1, hematocrit 39.1, MCV 96, platelet count 306,000. ANC was 5.9.  CBC 07/01/2015 revealed a WBC of 15.5, hemoglobin 12.8, hematocrit 37.9, MCV 96, platelet count 306,000. Neutrophil was 11.2. CMP was unremarkable.  Medication review is negative for steroids. The patient had persistent leukocytosis.  PATHOLOGY: Comprehensive Report done on 08/19/2015 by [facility]. Final Diagnosis: Peripheral blood: High-normal white blood cell count with a normal differential. Normochromic, normocytic red blood cells with probable rouleaux formation.  Flow Cytometry done on 08/16/2015 by [facility]. Final Diagnosis: Peripheral blood: Flow Impression:  No increase in myeloid or  lymphoid blasts identified.  Immunophenotypic abnormalities of circulating granulocytes and monocytes identified. No immunophenotypically abnormal B- or T-cell population identified.  FISH report done on 08/19/2015 by [facility]. Final Diagnosis: Peripheral blood: FISH Impression:  Negative for the tested MDS-associated genetic abnormalities.      PREVIOUS INTERVENTIONS:   Observation.              Oncology/Hematology History    No history exists.       PAST MEDICAL HISTORY:  ALLERGIES:  Allergies   Allergen Reactions   • Aspirin Hives     High dose   • Caffeine    • Aloe Rash     CURRENT MEDICATIONS:  Outpatient Encounter Medications as of 12/16/2020   Medication Sig Dispense Refill   • ACETAMINOPHEN-CODEINE PO Take 75 mg by mouth.     • BEE POLLEN PO Take by mouth     • busPIRone (BUSPAR) 15 MG tablet      • cyclobenzaprine (FLEXERIL) 10 MG tablet 3 (Three) Times a Day.     • esomeprazole (nexIUM) 40 MG capsule      • gabapentin (NEURONTIN) 800 MG tablet      • latanoprost (XALATAN) 0.005 % ophthalmic solution      • lisinopril (PRINIVIL,ZESTRIL) 20 MG tablet      • LUTEIN-BILBERRY PO Take by mouth     • mirtazapine (REMERON) 45 MG tablet      • Multiple Vitamins-Minerals (OCUVITE PO) Take by mouth     • naproxen (NAPROSYN) 125 MG/5ML suspension Take by mouth 2 times daily     • Omega-3 Fatty Acids (FISH OIL PO) Take by mouth     • prazosin (MINIPRESS) 2 MG capsule      • Probiotic Product (PROBIOTIC & ACIDOPHILUS EX ST) capsule Take by mouth     • QUEtiapine (SEROquel) 100 MG tablet Take 100 mg by mouth Every Night.     • temazepam (RESTORIL) 30 MG capsule      • Vitamin D, Cholecalciferol, (CHOLECALCIFEROL) 400 units tablet Take 800 Units by mouth Daily.     • vitamin E 100 UNIT capsule Take 100 Units by mouth daily       No facility-administered encounter medications on file as of 12/16/2020.      ADULT ILLNESSES:  Patient Active Problem List   Diagnosis Code   • Degeneration of lumbar intervertebral disc M51.36    • Lumbar radiculopathy M54.16   • Neutrophilic leukocytosis D72.9     SURGERIES:  Past Surgical History:   Procedure Laterality Date   • FOREIGN BODY REMOVAL Left 2015    left forearm   • GALLBLADDER SURGERY      laparoscopic   • SHOULDER SURGERY Right     x 2     HEALTH MAINTENANCE ITEMS:  Health Maintenance Due   Topic Date Due   • COLONOSCOPY  1975   • ANNUAL PHYSICAL  06/17/1977   • HEPATITIS C SCREENING  10/27/2017       <no information>  Last Completed Colonoscopy       Status Date      COLONOSCOPY No completions recorded          There is no immunization history on file for this patient.  Last Completed Mammogram     Patient has no health maintenance due at this time            FAMILY HISTORY:  Family History   Problem Relation Age of Onset   • Alcohol abuse Mother    • Heart disease Mother    • Hypertension Mother    • Mental illness Mother    • Depression Mother    • Diabetes Father    • Heart disease Father    • Hypertension Father    • Depression Sister    • Mental illness Sister    • Depression Sister    • Mental illness Sister    • Depression Sister    • Mental illness Sister    • Mental illness Brother    • Depression Brother    • Glaucoma Brother    • Crohn's disease Brother    • Hypertension Brother    • Alcohol abuse Brother    • Early death Brother         murder   • Cancer Maternal Aunt         lung     SOCIAL HISTORY:  Social History     Socioeconomic History   • Marital status: Single     Spouse name: Not on file   • Number of children: Not on file   • Years of education: Not on file   • Highest education level: Not on file   Occupational History   • Occupation: Disabled   Tobacco Use   • Smoking status: Former Smoker   • Smokeless tobacco: Never Used   • Tobacco comment: Quit 2011 still smokes occassionally   Substance and Sexual Activity   • Alcohol use: No   • Drug use: No   • Sexual activity: Defer       REVIEW OF SYSTEMS:    Review of Systems   Constitutional: Positive for fatigue.  Negative for chills and fever.   Respiratory: Negative for cough, shortness of breath and wheezing.    Cardiovascular: Negative for chest pain and palpitations.   Gastrointestinal: Negative for abdominal pain, blood in stool, constipation, diarrhea, nausea and vomiting.   Genitourinary: Negative for dysuria, flank pain and hematuria.   Neurological: Negative for speech difficulty, weakness and confusion.   Psychiatric/Behavioral: Negative for agitation and hallucinations.       VITAL SIGNS: There were no vitals taken for this visit. There is no height or weight on file to calculate BSA.   There were no vitals filed for this visit.      PHYSICAL EXAMINATION: No physical examination, telemedicine.    Physical Exam    LABS    No results for input(s): HGB, HCT, MCV, WBC, RDW, MPV, PLT, AUTOIGPER, NEUTROABS, LYMPHSABS, MONOSABS, EOSABS, BASOSABS, AUTOIGNUM, NRBC, NEUTRELPCTM, MONOPCT, BASOPCT, ATYLMPCT, ANISOCYTOSIS, GIANTPLTS in the last 63178 hours.    Invalid input(s): LYMPHOCPCT, ABCMORPH    No results for input(s): GLUCOSE, NA, K, CO2, CL, ANIONGAP, CREATININE, BUN, BCR, CALCIUM, EGFRIFNONA, ALKPHOS, PROTEINTOT, ALT, AST, BILITOT, ALBUMIN, GLOB in the last 92460 hours.    Invalid input(s): LABIL    No results for input(s): MSPIKE, KAPPALAMB, IGLFLC, URICACID, FREEKAPPAL, CEA, LDH, REFLABREPO in the last 07464 hours.    No results for input(s): IRON, TIBC, LABIRON, FERRITIN, [id number], TSH, FOLATE in the last 35150 hours.    Invalid input(s): VITB12          ASSESSMENT:  1.    Normocytic anemia, likely from chronic disease. Observe.   2.    Chronic low back pain.  3.    Essential hypertension.  On metoprolol.  4.    Depression.  On Seroquel.  5.    Herniated disk, lumbar spine.  6.    Leukocytosis, reactive, from steroids.    7.    Lesion in the right lobe of the liver measures 5.1 cm in size.   He had history of cavernous hemangioma.   8.    Obesity BMI 31.1.         PLAN:  1.     Re:  No MRI of the liver was  "done.  \"I did not do the MRI.\"  2.     Re:  Heme status.  None.   3.     Re:  Pre-office ferritin, TIBC, % saturation, and iron.  None.  4.     Re:  Pre-office CMP.  GFR 89 ml/minute.  5.    Blood for CBC with differential, ferritin and iron panel next week.   6.    Schedule MRI abdomen for lesion right lobe of the liver.  7.    Plan of care discussed with patient.  Understanding expressed.  Patient agreeable to proceed.  8.    Continue ongoing management per primary care physician and other specialists.  9.    Return to office in 6 months with pre-office CMP, ferritin, TIBC, % saturation, iron, and CBC with differential if MRI unremarkable.        I spent 19 total minutes, caring for Tomasa today.  Greater than 50% of this time involved counseling and/or coordination of care as documented within this note regarding the patient's illness(es), pros and cons of various treatment options, instructions and/or risk reduction.             cc:  (Ronel Anna, DO)         Kamila Gibson MD          (Buddy Ma MD)         (Anders Pedro MD)         (Lavon Suggs MD)         (Darwin Alexander MD)  "

## 2020-12-11 ENCOUNTER — LAB (OUTPATIENT)
Dept: FAMILY MEDICINE CLINIC | Facility: CLINIC | Age: 45
End: 2020-12-11

## 2020-12-16 ENCOUNTER — OFFICE VISIT (OUTPATIENT)
Dept: ONCOLOGY | Facility: CLINIC | Age: 45
End: 2020-12-16

## 2020-12-16 DIAGNOSIS — D72.9 NEUTROPHILIC LEUKOCYTOSIS: Primary | ICD-10-CM

## 2020-12-16 DIAGNOSIS — D63.8 ANEMIA IN OTHER CHRONIC DISEASES CLASSIFIED ELSEWHERE: ICD-10-CM

## 2020-12-16 DIAGNOSIS — K76.9 LIVER LESION: ICD-10-CM

## 2020-12-16 PROCEDURE — 99213 OFFICE O/P EST LOW 20 MIN: CPT | Performed by: INTERNAL MEDICINE

## 2021-02-16 ENCOUNTER — APPOINTMENT (OUTPATIENT)
Dept: MRI IMAGING | Facility: HOSPITAL | Age: 46
End: 2021-02-16

## 2021-02-25 ENCOUNTER — TELEPHONE (OUTPATIENT)
Dept: ONCOLOGY | Facility: CLINIC | Age: 46
End: 2021-02-25

## 2021-02-25 NOTE — TELEPHONE ENCOUNTER
Caller: dasha    Relationship to patient: wife    Best call back number: [phone]    Wife would like to resched the pt's mri appt on 2/26 due to car trouble. Wife prefers the week of 3/8 for new appt. Unable to resched within hub timeframe.

## 2021-06-16 ENCOUNTER — TELEPHONE (OUTPATIENT)
Dept: ONCOLOGY | Facility: CLINIC | Age: 46
End: 2021-06-16

## 2021-06-16 NOTE — TELEPHONE ENCOUNTER
Caller: NADIA    Relationship: WIFE    Best call back number: [phone]    What orders are you requesting (i.e. lab or imaging): LAB    Where will you receive your lab/imaging services: HIS PCP    Additional notes: PT'S WIFE IS ASKING TO HAVE LAB ORDER FAXED TO HER @ [phone] BEFORE 5PM TODAY SO THAT HE CAN HAVE LABS DRAWN WITH HIS PCP TOMORROW MORNING

## 2021-06-16 NOTE — TELEPHONE ENCOUNTER
Caller: Angelica Flores    Relationship: Emergency Contact [employer]    Best call back number: [phone]    What orders are you requesting (i.e. lab or imaging): ORDERS FOR LABS NEEDED TO BE COMPLETE BEFORE APPT WITH MD THE WEEK AFTER    In what timeframe would the patient need to come in: PT WILL BE SEEN AT PCP OFFICE IN THE MORNING, 6/16/21 AT 8AM.    Where will you receive your lab/imaging services: PT PCP DR. LUC HERNADEZ.    FAX: [phone]    Additional notes:

## (undated) DEVICE — NERVE BLOCK TRAY (FACET)-LF: Brand: MEDLINE INDUSTRIES, INC.

## (undated) DEVICE — ENCORE® LATEX MICRO SIZE 6, STERILE LATEX POWDER-FREE SURGICAL GLOVE: Brand: ENCORE

## (undated) DEVICE — ENCORE® LATEX MICRO SIZE 7.5, STERILE LATEX POWDER-FREE SURGICAL GLOVE: Brand: ENCORE

## (undated) DEVICE — ENCORE® LATEX MICRO SIZE 6.5, STERILE LATEX POWDER-FREE SURGICAL GLOVE: Brand: ENCORE